# Patient Record
Sex: FEMALE | Race: OTHER | Employment: FULL TIME | URBAN - METROPOLITAN AREA
[De-identification: names, ages, dates, MRNs, and addresses within clinical notes are randomized per-mention and may not be internally consistent; named-entity substitution may affect disease eponyms.]

---

## 2024-08-15 ENCOUNTER — OFFICE VISIT (OUTPATIENT)
Age: 29
End: 2024-08-15

## 2024-08-15 VITALS
HEIGHT: 65 IN | OXYGEN SATURATION: 97 % | TEMPERATURE: 98.4 F | SYSTOLIC BLOOD PRESSURE: 127 MMHG | WEIGHT: 193.8 LBS | HEART RATE: 89 BPM | RESPIRATION RATE: 18 BRPM | DIASTOLIC BLOOD PRESSURE: 89 MMHG | BODY MASS INDEX: 32.29 KG/M2

## 2024-08-15 DIAGNOSIS — J02.9 SORE THROAT: Primary | ICD-10-CM

## 2024-08-15 LAB
STREP PYOGENES DNA, POC: NEGATIVE
VALID INTERNAL CONTROL, POC: NORMAL

## 2024-08-15 RX ORDER — CETIRIZINE HYDROCHLORIDE 10 MG/1
10 TABLET ORAL DAILY
COMMUNITY

## 2024-08-15 ASSESSMENT — ENCOUNTER SYMPTOMS
SHORTNESS OF BREATH: 0
SORE THROAT: 1
COUGH: 0
DIARRHEA: 0
NAUSEA: 0
VOMITING: 0

## 2024-08-15 NOTE — PROGRESS NOTES
Subjective     Chief Complaint   Patient presents with    Pharyngitis     Pt presents with throat concerns; pt states ~ 3 weeks ago, she ate a salad and since then, it has felt like something may be stuck in throat; no trouble breathing or no other symptoms         Pharyngitis  Associated symptoms: sore throat    Associated symptoms: no congestion, no cough, no diarrhea, no fever, no nausea, no shortness of breath and no vomiting     29-year-old female presents for sore throat going on for the past 1 to 2 weeks.  Feels like there is something stuck in her throat.  No fever chills nausea vomiting.  Patient does take Zyrtec on a regular basis.    History reviewed. No pertinent past medical history.    History reviewed. No pertinent surgical history.    History reviewed. No pertinent family history.    No Known Allergies    Social History     Tobacco Use    Smoking status: Never    Smokeless tobacco: Never       Vitals:    08/15/24 1536   BP: (!) 129/92   Pulse: 89   Resp: 18   Temp: 98.4 °F (36.9 °C)   SpO2: 97%       Review of Systems   Constitutional:  Negative for chills and fever.   HENT:  Positive for sore throat. Negative for congestion.    Respiratory:  Negative for cough and shortness of breath.    Gastrointestinal:  Negative for diarrhea, nausea and vomiting.       Objective     Physical Exam  Vitals reviewed.   Constitutional:       Appearance: Normal appearance.   HENT:      Right Ear: Tympanic membrane normal.      Left Ear: Tympanic membrane normal.      Nose: Nose normal.      Mouth/Throat:      Mouth: Mucous membranes are moist.      Pharynx: Oropharynx is clear. No oropharyngeal exudate or posterior oropharyngeal erythema.   Eyes:      Extraocular Movements: Extraocular movements intact.      Conjunctiva/sclera: Conjunctivae normal.      Pupils: Pupils are equal, round, and reactive to light.   Cardiovascular:      Rate and Rhythm: Normal rate and regular rhythm.      Heart sounds: Normal heart sounds.

## 2024-08-15 NOTE — PATIENT INSTRUCTIONS
Thank you for visiting [facility] today.    -Tylenol/Ibuprofen for pain/fever  -Throat lozenges or throat sprays may help with discomfort  -Salt water gargles with 1/2 teaspoon to 1 teaspoon of Benadryl  -Soft, cold foods may soothe your throat as well as ice chips  -Increase humidity in house  -Viscous lidocaine gargles, if prescribed  -Follow up with ENT if no improvement    If you begin to have worsening pain, uncontrollable fever greater than 100.4 or difficulty swallowing, please go to the ER.